# Patient Record
Sex: FEMALE
[De-identification: names, ages, dates, MRNs, and addresses within clinical notes are randomized per-mention and may not be internally consistent; named-entity substitution may affect disease eponyms.]

---

## 2020-10-09 ENCOUNTER — HOSPITAL ENCOUNTER (EMERGENCY)
Dept: HOSPITAL 92 - ERS | Age: 40
Discharge: HOME | End: 2020-10-09
Payer: COMMERCIAL

## 2020-10-09 DIAGNOSIS — N83.201: ICD-10-CM

## 2020-10-09 DIAGNOSIS — K57.32: Primary | ICD-10-CM

## 2020-10-09 LAB
ALBUMIN SERPL BCG-MCNC: 4 G/DL (ref 3.5–5)
ALP SERPL-CCNC: 77 U/L (ref 40–110)
ALT SERPL W P-5'-P-CCNC: 17 U/L (ref 8–55)
ANION GAP SERPL CALC-SCNC: 11 MMOL/L (ref 10–20)
AST SERPL-CCNC: 12 U/L (ref 5–34)
BASOPHILS # BLD AUTO: 0.1 THOU/UL (ref 0–0.2)
BASOPHILS NFR BLD AUTO: 0.8 % (ref 0–1)
BILIRUB SERPL-MCNC: 0.6 MG/DL (ref 0.2–1.2)
BUN SERPL-MCNC: 17 MG/DL (ref 7–18.7)
CALCIUM SERPL-MCNC: 9.2 MG/DL (ref 7.8–10.44)
CHLORIDE SERPL-SCNC: 102 MMOL/L (ref 98–107)
CO2 SERPL-SCNC: 27 MMOL/L (ref 22–29)
CREAT CL PREDICTED SERPL C-G-VRATE: 0 ML/MIN (ref 70–130)
EOSINOPHIL # BLD AUTO: 0.3 THOU/UL (ref 0–0.7)
EOSINOPHIL NFR BLD AUTO: 3.3 % (ref 0–10)
GLOBULIN SER CALC-MCNC: 3.4 G/DL (ref 2.4–3.5)
GLUCOSE SERPL-MCNC: 105 MG/DL (ref 70–105)
HGB BLD-MCNC: 14.3 G/DL (ref 12–16)
LYMPHOCYTES # BLD: 2.6 THOU/UL (ref 1.2–3.4)
LYMPHOCYTES NFR BLD AUTO: 31.6 % (ref 21–51)
MCH RBC QN AUTO: 29.5 PG (ref 27–31)
MCV RBC AUTO: 87.3 FL (ref 78–98)
MONOCYTES # BLD AUTO: 0.5 THOU/UL (ref 0.11–0.59)
MONOCYTES NFR BLD AUTO: 6.4 % (ref 0–10)
NEUTROPHILS # BLD AUTO: 4.8 THOU/UL (ref 1.4–6.5)
NEUTROPHILS NFR BLD AUTO: 57.9 % (ref 42–75)
PLATELET # BLD AUTO: 425 THOU/UL (ref 130–400)
POTASSIUM SERPL-SCNC: 3.7 MMOL/L (ref 3.5–5.1)
PREGS CONTROL BACKGROUND?: (no result)
PREGS CONTROL BAR APPEAR?: YES
PROT UR STRIP.AUTO-MCNC: 10 MG/DL
RBC # BLD AUTO: 4.84 MILL/UL (ref 4.2–5.4)
SODIUM SERPL-SCNC: 136 MMOL/L (ref 136–145)
SP GR UR STRIP: 1.03 (ref 1–1.04)
WBC # BLD AUTO: 8.3 THOU/UL (ref 4.8–10.8)

## 2020-10-09 PROCEDURE — 85025 COMPLETE CBC W/AUTO DIFF WBC: CPT

## 2020-10-09 PROCEDURE — 96375 TX/PRO/DX INJ NEW DRUG ADDON: CPT

## 2020-10-09 PROCEDURE — 80053 COMPREHEN METABOLIC PANEL: CPT

## 2020-10-09 PROCEDURE — 84703 CHORIONIC GONADOTROPIN ASSAY: CPT

## 2020-10-09 PROCEDURE — 81003 URINALYSIS AUTO W/O SCOPE: CPT

## 2020-10-09 PROCEDURE — 96374 THER/PROPH/DIAG INJ IV PUSH: CPT

## 2020-10-09 PROCEDURE — 74177 CT ABD & PELVIS W/CONTRAST: CPT

## 2020-10-09 NOTE — CT
CT ABDOMEN AND PELVIS WITH IV CONTRAST

 10/9/2020



CLINICAL INFORMATION:

Right lower quadrant abdominal pain.



COMPARISON:

 8/24/2017.



Technique:

Multiple contiguous axial CT images are obtained through the abdomen and pelvis with IV contrast. Cor
onal reformatted images are provided.



FINDINGS:



Lower Chest: Subsegmental atelectasis right lung base.

Vessels: Abdominal aorta is normal in caliber. 



Abdomen:

Portal vein:Patent

Gallbladder: Surgically absent.

Liver: Low density area seen in the right hepatic lobe which is just to the liver and is thought to m
ore likely be artifactual as opposed to an actual lesion. Similar finding but more subtle in

appearance was seen on study in 2017 and appear to have also been present on study in 2011. Liver oth
erwise has a normal CT appearance.

Spleen: within normal limits.

Pancreas: within normal limits.

Adrenals: within normal limits.

Kidneys: within normal limits.



Bowel: Colonic diverticulosis seen throughout the colon. There is minimal stranding adjacent to the r
egion of the ascending colon which is in region of multiple diverticula, and these findings are

likely related to mild focal diverticulitis. No significant wall thickening is appreciated. The appen
tamie is closely adjacent to this region but is filled with gas and normal in caliber. Loops of small

bowel are normal in caliber



Peritoneum: No ascites or free air; no fluid collection.

Mesentery and Retroperitoneum: No enlarged mesenteric or retroperitoneal lymph nodes.

Abdominal Wall: within normal limits.



Pelvis:

Reproductive Organs: Low density area seen within the cervix probably related to a nabothian cyst, bu
t this is difficult to adequately evaluate on this examination. There is a small hypodense lesion

with heterogeneous enhancement measuring 1.5 cm in the left aspect of the uterine fundus. There is al
so nodular area of prominence seen in the more posterior aspect of the uterine fundus without well

defined margins. Findings May be related to uterine fibroids. A 2.87 a low-density structure is seen 
in the right adnexa probably representing a small right ovarian cyst.

Bladder: within normal limits.



Bones: No suspicious lytic or sclerotic osseous lesions.  



IMPRESSION:



1. Findings worrisome for diverticulitis involving the most proximal ascending colon. Multiple coloni
c diverticula are seen throughout the colon.

2. Probable uterine fibroids with a low-density area in the right aspect of the cervix which may repr
esent a nabothian cyst but is difficult to adequately evaluate on this exam. In addition, there is

a hypodense cystic structure right adnexa probably due to a right ovarian cyst. Pelvic ultrasound may
 be helpful for further evaluation.

3. Postcholecystectomy changes.



Reported By: Steve Cage 

Electronically Signed:  10/9/2020 10:23 AM

## 2020-11-30 ENCOUNTER — HOSPITAL ENCOUNTER (OUTPATIENT)
Dept: HOSPITAL 92 - BICULT | Age: 40
Discharge: HOME | End: 2020-11-30
Attending: STUDENT IN AN ORGANIZED HEALTH CARE EDUCATION/TRAINING PROGRAM
Payer: COMMERCIAL

## 2020-11-30 DIAGNOSIS — N83.201: Primary | ICD-10-CM

## 2020-11-30 DIAGNOSIS — N94.6: ICD-10-CM

## 2020-11-30 DIAGNOSIS — D25.9: ICD-10-CM

## 2020-11-30 DIAGNOSIS — N85.8: ICD-10-CM

## 2020-11-30 PROCEDURE — 76856 US EXAM PELVIC COMPLETE: CPT

## 2020-11-30 PROCEDURE — 93976 VASCULAR STUDY: CPT

## 2020-11-30 NOTE — ULT
PELVIC ULTRASOUND:

 

Transabdominal ultrasound of pelvis performed. 

 

INDICATION:

Pelvic pain. Follow-up CT abdomen 10/09/2020 which revealed cyst in the right adnexa and evidence of 
uterine fibroids. 

 

FINDINGS:

Uterus is enlarged and slightly heterogeneous. Evidence of a left fundal fibroid measuring up to 2.7 
cm and a posterior fundal fibroid measuring up to 3.0 cm. 

 

Both ovaries are identified and appear unremarkable. No ovarian cyst identified. 

 

Color Doppler and spectral analysis demonstrates normal blood flow to both ovaries. 

 

Nabothian cyst seen in the region of the cervix. 

 

The endometrial stripe is normal, measured at 8-10 mm. 

 

IMPRESSION: 

1.  Heterogeneity in the uterus consistent with uterine fibroids. 

2.  Pelvic ultrasound otherwise unremarkable. 

 

 

POS: OFF